# Patient Record
Sex: MALE
[De-identification: names, ages, dates, MRNs, and addresses within clinical notes are randomized per-mention and may not be internally consistent; named-entity substitution may affect disease eponyms.]

---

## 2019-09-16 ENCOUNTER — HOSPITAL ENCOUNTER (EMERGENCY)
Dept: HOSPITAL 72 - EMR | Age: 24
LOS: 1 days | Discharge: HOME | End: 2019-09-17
Payer: MEDICAID

## 2019-09-16 VITALS — SYSTOLIC BLOOD PRESSURE: 126 MMHG | DIASTOLIC BLOOD PRESSURE: 60 MMHG

## 2019-09-16 VITALS — BODY MASS INDEX: 21.22 KG/M2 | WEIGHT: 140 LBS | HEIGHT: 68 IN

## 2019-09-16 DIAGNOSIS — Z13.89: Primary | ICD-10-CM

## 2019-09-16 PROCEDURE — 99281 EMR DPT VST MAYX REQ PHY/QHP: CPT

## 2019-09-16 PROCEDURE — 81003 URINALYSIS AUTO W/O SCOPE: CPT

## 2019-09-17 VITALS — DIASTOLIC BLOOD PRESSURE: 63 MMHG | SYSTOLIC BLOOD PRESSURE: 122 MMHG

## 2019-09-17 LAB
APPEARANCE UR: CLEAR
APTT PPP: (no result) S
GLUCOSE UR STRIP-MCNC: NEGATIVE MG/DL
KETONES UR QL STRIP: NEGATIVE
LEUKOCYTE ESTERASE UR QL STRIP: NEGATIVE
NITRITE UR QL STRIP: NEGATIVE
PH UR STRIP: 6 [PH] (ref 4.5–8)
PROT UR QL STRIP: NEGATIVE
SP GR UR STRIP: 1.01 (ref 1–1.03)
UROBILINOGEN UR-MCNC: NORMAL MG/DL (ref 0–1)

## 2019-09-17 NOTE — EMERGENCY ROOM REPORT
History of Present Illness


General


Chief Complaint:  General Complaint


Source:  Patient





Present Illness


HPI


The patient presents requesting forms to be filled out specifying that he has a 

shy bladder.  He is in school for training as it .  Apparently a week 

ago he was sent to get a drug screen.  He urinated before and during that time.

  Was unable to produce 45 mils of urine.  He presents asked presents asking 

that we fill out his paperwork.





The patient denies drug use or psychiatric problems.  He denies dysuria or 

difficulty urinating.


Allergies:  


Coded Allergies:  


     No Known Allergies (Unverified , 9/16/19)





Patient History


Past Medical History:  see triage record


Social History:  Denies: smoking, alcohol use, drug use


Social History Narrative





Reviewed Nursing Documentation:  PMH: Agreed; PSxH: Agreed





Nursing Documentation-PMH


Past Medical History:  No Stated History





Review of Systems


Constitutional:  Denies: fever


Respiratory:  Denies: shortness of breath


Cardiovascular:  Denies: chest pain


Gastrointestinal:  Denies: abdominal pain, diarrhea, nausea, vomiting


Genitourinary:  Reports: see HPI


Musculoskeletal:  Denies: back pain


Skin:  Denies: rash


Psychiatric:  Reports: see HPI


Neurological:  Denies: seizure


Endocrine:  Denies: increased thirst, increased urine





Physical Exam





Vital Signs








  Date Time  Temp Pulse Resp B/P (MAP) Pulse Ox O2 Delivery O2 Flow Rate FiO2


 


9/16/19 21:44 97.7 77 17 126/60 (82) 98   


 


9/16/19 22:00      Room Air  








Sp02 EP Interpretation:  reviewed, normal


General Appearance:  well appearing, no apparent distress, GCS 15


Head:  normocephalic, atraumatic


Eyes:  bilateral eye normal inspection, bilateral eye PERRL, bilateral eye EOMI


ENT:  moist mucus membranes


Respiratory:  chest non-tender, lungs clear, normal breath sounds


Cardiovascular #1:  regular rate, rhythm


Cardiovascular #2:  2+ radial (R)


Gastrointestinal:  normal inspection, scaphoid


Genitourinary:  no CVA tenderness


Musculoskeletal:  gait/station normal


Neurologic:  alert, grossly normal


Psychiatric:  mood/affect normal - Lacks understanding of the problem


Skin:  no rash





Medical Decision Making


Diagnostic Impression:  


 Primary Impression:  


 Evaluation for shy bladder


 Additional Impression:  


 Medical screening exam


ER Course


Patient presents for evaluation for shy bladder.  By his history this does not 

seem to be present.  Urinalysis will be obtained.





Patient needed to be reminded several times while he was waiting and that he 

needs to give a urine sample.





Urinalysis clear with adequate volume.





Paperwork filled out stating that the patient does not have a shy bladder.





Patient stable for outpatient observation and treatment.





Laboratory Tests








Test


  9/16/19


23:51


 


Urine Color Pale yellow  


 


Urine Appearance Clear  


 


Urine pH 6 (4.5-8.0)  


 


Urine Specific Gravity


  1.010


(1.005-1.035)


 


Urine Protein


  Negative


(NEGATIVE)


 


Urine Glucose (UA)


  Negative


(NEGATIVE)


 


Urine Ketones


  Negative


(NEGATIVE)


 


Urine Blood


  Negative


(NEGATIVE)


 


Urine Nitrite


  Negative


(NEGATIVE)


 


Urine Bilirubin


  Negative


(NEGATIVE)


 


Urine Urobilinogen


  Normal MG/DL


(0.0-1.0)


 


Urine Leukocyte Esterase


  Negative


(NEGATIVE)











Last Vital Signs








  Date Time  Temp Pulse Resp B/P (MAP) Pulse Ox O2 Delivery O2 Flow Rate FiO2


 


9/17/19 00:45 97.7 70 18 122/63 98 Room Air  








Status:  unchanged


Disposition:  HOME, SELF-CARE


Condition:  Stable











Sivakumar Matthews MD Sep 17, 2019 00:41